# Patient Record
Sex: FEMALE | Race: WHITE
[De-identification: names, ages, dates, MRNs, and addresses within clinical notes are randomized per-mention and may not be internally consistent; named-entity substitution may affect disease eponyms.]

---

## 2017-03-16 ENCOUNTER — HOSPITAL ENCOUNTER (EMERGENCY)
Dept: HOSPITAL 45 - C.EDB | Age: 37
Discharge: HOME | End: 2017-03-16
Payer: COMMERCIAL

## 2017-03-16 VITALS
HEIGHT: 62.01 IN | HEIGHT: 62.01 IN | WEIGHT: 123.24 LBS | BODY MASS INDEX: 22.39 KG/M2 | WEIGHT: 123.24 LBS | BODY MASS INDEX: 22.39 KG/M2

## 2017-03-16 VITALS — TEMPERATURE: 97.88 F

## 2017-03-16 VITALS — HEART RATE: 85 BPM | OXYGEN SATURATION: 97 %

## 2017-03-16 VITALS — SYSTOLIC BLOOD PRESSURE: 107 MMHG | DIASTOLIC BLOOD PRESSURE: 68 MMHG

## 2017-03-16 DIAGNOSIS — T78.40XA: Primary | ICD-10-CM

## 2017-03-16 DIAGNOSIS — J45.909: ICD-10-CM

## 2017-03-16 DIAGNOSIS — F17.200: ICD-10-CM
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
## 2017-03-17 NOTE — EMERGENCY ROOM VISIT NOTE
History


Report prepared by Rudy:  Amy Martinez


Under the Supervision of:  Dr. Esteban Abdi M.D.


First contact with patient:  20:59


Chief Complaint:  ALLERGIC REACTION


Stated Complaint:  ALLERGIC REACTION TO MUSHROOMS





History of Present Illness


The patient is a 37 year old female who presents to the Emergency Room with 

complaints of an episode of an allergic reaction beginning 1 hour PTA. The 

patient has a known allergy to mushrooms. She was eating a salad tonight and 

did not know that there were mushrooms in it. She ingested some of the 

mushrooms around 8pm. Since then she has developed tightness in throat and 

states that her face is flushed. She has had a similar reaction to mushrooms in 

the past. The patient denies any trouble breathing or shortness of breath. She 

has a history of asthma and has been using her inhaler, but states that it is 

not alleviating her sensations of tightness in her throat. She was feeling fine 

before eating the salad this evening. The patient denies any chance of 

pregnancy. She denies hives.





   Source of History:  patient


   Onset:  1 hour PTA


   Position:  other (global)


   Quality:  other (allergic reaction)


   Timing:  other (episode)


   Modifying Factors (Worsening):  eating (mushrooms)


   Associated Symptoms:  No SOB, No rash


Note:


Pt reports tightness in her throat.





Review of Systems


See HPI for pertinent positives & negatives. A total of 10 systems reviewed and 

were otherwise negative.





Past Medical & Surgical


Medical Problems:


(1) Asthma








Family History


No pertinent history stated.





Social History


Smoking Status:  Current Every Day Smoker





Current/Historical Medications


Scheduled


Divalproex Sodium (Depakote Er), 500 MG PO QAM


Divalproex Sodium (Depakote Etended-Release), 1,000 MG PO HS


Magnesium Oxide (Mag-Ox), 400 MG PO DAILY


Prednisone (Prednisone), 3 TAB PO DAILY


[vitamin b], 1 DROP PO DAILY





Allergies


Coded Allergies:  


     Penicillins (Unverified  Allergy, Unknown, anaphylaxis, 3/16/17)





Physical Exam


Vital Signs











  Date Time  Temp Pulse Resp B/P Pulse Ox O2 Delivery O2 Flow Rate FiO2


 


3/16/17 23:14  85 16  97 Room Air  


 


3/16/17 22:56  90 22 107/68 99 Room Air  


 


3/16/17 21:42  105 18  100 Room Air  


 


3/16/17 21:42     100 Room Air  


 


3/16/17 21:41  93      


 


3/16/17 20:55 36.6 107 18 123/90 95 Room Air  











Physical Exam





Constitutional: Vital signs reviewed.


Eyes: Pupils are equal round reactive to light.  Conjunctiva are noninjected.  


ENT: Pharynx is clear without erythema or exudate.  Mucous membranes are moist.

  No swelling to the tongue or uvula.  Neck supple without meningeal signs.


Respiratory: Clear to auscultation bilaterally.  Breath sounds are equal 

bilaterally. No wheezing or stridor. 


Cardiovascular: Regular rate and rhythm.  No rubs or gallops.


GI: Soft, nondistended and nontender.  Bowel sounds are present.


Musculoskeletal: No peripheral edema.  


Integumentary: No cyanosis. No hives, just mild facial flushing. 


Neurological: The patient is awake and alert.  No focal deficits.


Psychiatric: Normal affect.





Medical Decision & Procedures


Medications Administered











 Medications


  (Trade)  Dose


 Ordered  Sig/Anmol


 Route  Start Time


 Stop Time Status Last Admin


Dose Admin


 


 Albuterol/


 Ipratropium


  (Duoneb)  3 ml  NOW  STAT


 INH  3/16/17 21:04


 3/16/17 21:06 DC 3/16/17 21:37


3 ML


 


 Methylprednisolone


 Sodium Succinate


  (Solu-Medrol IV)  125 mg  NOW  STAT


 IV  3/16/17 21:04


 3/16/17 21:06 DC 3/16/17 21:38


125 MG


 


 Diphenhydramine


 HCl


  (Benadryl Inj)  50 mg  NOW  STAT


 IV  3/16/17 21:04


 3/16/17 21:06 DC 3/16/17 21:39


50 MG


 


 Famotidine


  (Pepcid 20mg/100


 ml)  20 mg  ONE  STAT


 IV  3/16/17 21:04


 3/16/17 21:06 DC 3/16/17 21:38


20 MG











ED Course


2059: The patient was evaluated in room B4B. A complete history and physical 

exam was performed.





2104: Famotidine 20 mg IV, Benadryl 50 mg IV, Solu-Medrol 125 mg IV, Duoneb 3 

ml INH





2215: I reassessed the patient. Her breathing is better and she no longer has a 

tight feeling in her throat. She is starting to develop hives on her arms. 





2303: I reassessed the patient at this time. She is feeling better and resting 

comfortably. She no longer has any shortness of breath or tightness in her 

throat. She does have some itching and hives on her hands. I discussed the 

results and treatment plan with the patient. I answered all pertaining 

questions that she had. She expressed understanding and verbalized agreement. 

The patient will be discharged home.





Medical Decision


This is a 37-year-old female presents with allergic reaction.  I did perform a 

limited focused review of portions of the patient's old chart on the electronic 

medical record. The patient has had no recent pertinent visits to this hospital.





I did evaluate the patient as noted above.  IV access was established.  The 

patient was placed on a continuous cardiac monitor.  I did treat patient with 

IV Solu-Medrol, Pepcid and Benadryl.  She was also given a DuoNeb.  She was 

observed in the emergency department.  She did have improvement of her symptoms 

although develop some hives on her hands.  She does have a history of eczema.  

She did feel better and felt ready for discharge.  Her lungs were clear to 

auscultation.  She had no swelling to her tongue or uvula.  She was advised to 

continue using antihistamines.  She was discharged with a prescription for 

prednisone.





Impression





 Primary Impression:  


 Allergic reaction





Scribe Attestation


The scribe's documentation has been prepared under my direct and personally 

reviewed by me in its entirety. I confirm that the note above accurately 

reflects all work, treatment, procedures, and medical decision making performed 

by me.





Departure Information


Dispostion


Home / Self-Care





Prescriptions





Prednisone (Prednisone) 20 Mg Tab


3 TAB PO DAILY, #12 TAB


   FOR 4 DAYS


   Prov: Esteban Abdi M.D.         3/16/17





Referrals


Juliette Membreno D.O. (PCP)





Forms


HOME CARE DOCUMENTATION FORM,                                                 

               IMPORTANT VISIT INFORMATION, Work Instructions





Patient Instructions


ED Allergic React Food, My [facility]





Additional Instructions





You have been examined and treated today on an emergency basis only. This is 

not a substitute for, or an effort to provide, complete comprehensive medical 

care. It is impossible to recognize and treat all injuries or illnesses in a 

single emergency department visit. It is therefore important that you follow up 

closely with your physician.  Call as soon as possible for an appointment.  

Return for worsening symptoms or if you develop difficulty breathing, swelling 

to your tongue or any other concerning symptoms.





Problem Qualifiers








 Primary Impression:  


 Allergic reaction


 Encounter type:  initial encounter  Qualified Codes:  T78.40XA - Allergy, 

unspecified, initial encounter

## 2017-05-06 ENCOUNTER — HOSPITAL ENCOUNTER (EMERGENCY)
Dept: HOSPITAL 45 - C.EDB | Age: 37
Discharge: HOME | End: 2017-05-06
Payer: COMMERCIAL

## 2017-05-06 VITALS
BODY MASS INDEX: 21.31 KG/M2 | HEIGHT: 62.01 IN | WEIGHT: 117.29 LBS | HEIGHT: 62.01 IN | WEIGHT: 117.29 LBS | BODY MASS INDEX: 21.31 KG/M2

## 2017-05-06 VITALS — OXYGEN SATURATION: 99 % | SYSTOLIC BLOOD PRESSURE: 114 MMHG | DIASTOLIC BLOOD PRESSURE: 78 MMHG | HEART RATE: 99 BPM

## 2017-05-06 VITALS — TEMPERATURE: 98.06 F

## 2017-05-06 DIAGNOSIS — L25.9: Primary | ICD-10-CM

## 2017-05-06 DIAGNOSIS — J45.909: ICD-10-CM

## 2017-05-06 DIAGNOSIS — Z79.899: ICD-10-CM

## 2017-05-06 NOTE — EMERGENCY ROOM VISIT NOTE
ED Visit Note


First contact with patient:  18:13


CHIEF COMPLAINT: Burning left arm skin rash since last evening





HISTORY OF PRESENT ILLNESS:  Patient is a right-hand-dominant 37-year-old white 

female who presents to the emergency department for evaluation of a rash on her 

left hand.  She works as a  at a local restaurant.  She does not know 

if the restaurant changed sanitizers or increased to be concentration last 

evening.  She does not wear gloves.  She states that she noticed some swelling 

and soreness in the back of her left hand in her fingers last night as she left 

for work.  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 

becomes swollen, so she did not think much of it, but throughout the night and 

when she woke up today, she noted redness, warmth and swelling spreading up the 

dorsum of the left hand to the mid forearm, and on the ulnar aspect of the 

forearm to the elbow.  She describes it as a tight, burning sensation.  She 

took Aleve and applied a hydrocortisone cream to the area, but it was 

ineffective.  She did work almost her entire shift as a  today.  She 

rates her discomfort an 8/10.  She does not have any symptoms on the right 

hand.  She denies any fever, chills, sweats, drainage or discharge from the 

area.  She is never had symptoms similar to this previously.  





REVIEW OF SYSTEMS:   Review of systems as per HPI.  All other systems reviewed 

were negative.  At least 6 systems reviewed.





PMH:  Electronic medical records are reviewed and summarized as above/below.  

See Problem List.





SOCIAL HISTORY:  Patient lives at home by herself.  She does smoke, denies 

alcohol use..    





PHYSICAL EXAM: Vital Signs: See nurses' notes.  CONSTITUTIONAL: Patient is a 

well-appearing 37-year-old white female who is awake and alert and in no acute 

distress.  INTEGUMENTARY: Examination of the left arm show a slightly raised, 

warm erythematous rash, involving the dorsum of the hand extending to the mid 

forearm, and wrapping around to the volar aspect of the forearm, sparing the 

palm and the fingers.  No blistering, skin sloughing, vesicles or petechiae are 

noted.  There is no lymphangitic streaking.  Capillary refills less than 2 

seconds.  Sensation light touch is intact.


   


ED course: The patient was seen and assessed as above.  She does appear to have 

a rash consistent with a contact dermatitis on her left hand.  It spares her 

fingers and the palm.  Skin is otherwise intact.  There is no blistering to 

indicate a burn.  I do not suspect cellulitis.  The patient was given 

prednisone 60 mg orally in the emergency department.  She will be placed on a 

short course of oral prednisone.  She is encouraged to protect the area when 

she returns to work and to wear gloves.  I cannot say whether this is related 

to her exposure at work or not.  It seems unusual that it would only involve 

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 

exposed to the same cleaning agents.


Problem List


Medical Problems:


(1) Allergic reaction


Status: Resolved  





(2) Asthma


Status: Chronic  





Surgical Problems:


(1) H/O  section


Status: Resolved  











Current/Historical Medications


Scheduled


Cyanocobalamin (B-12 Dots), 1,000 MCG PO DAILY


Divalproex Sodium (Depakote Er), 500 MG PO QAM


Divalproex Sodium (Depakote Etended-Release), 1,000 MG PO HS


Hydrocortisone (Topical) (Cortizone-10), 1 APPLN TOP TID


Magnesium Oxide (Mag-Ox), 400 MG PO DAILY


Naproxen (Aleve), 220 MG PO DAILY


Prednisone (Prednisone), 50 MG PO DAILY





Scheduled PRN


Sumatriptan Succinate (Imitrex), 500 MG PO PRN PRN for Migraine





Allergies


Coded Allergies:  


     Mushroom (Unverified  Allergy, Severe, ANAPHYLAXIS, 17)


     Penicillins (Unverified  Allergy, Unknown, anaphylaxis, 17)


Uncoded Allergies:  


     VENISON (Allergy, Severe, ANAPHYLAXIS, 17)





Vital Signs











  Date Time  Temp Pulse Resp B/P Pulse Ox O2 Delivery O2 Flow Rate FiO2


 


17 19:02  99 18 114/78 99   


 


17 17:53 36.7 82 16 125/76 97 Room Air  











Medications Administered











 Medications


  (Trade)  Dose


 Ordered  Sig/Anmol


 Route  Start Time


 Stop Time Status Last Admin


Dose Admin


 


 Prednisone


  (PredniSONE TAB)  60 mg  NOW  STAT


 PO  17 18:37


 17 18:38 DC 17 18:50


60 MG











Departure Information


Impression





 Primary Impression:  


 Contact dermatitis





Prescriptions





Prednisone (Prednisone) 50 Mg Tab


50 MG PO DAILY for 4 Days, #4 TAB


   Prov: Sarahi Weaver PA         17





Referrals


No Doctor, Assigned (PCP)





Patient Instructions


My [facility]





Additional Instructions





Prednisone 50mg:  Once daily until the prescription is finished.  It is best to 

take this earlier in the day as some patients note occasional difficulty 

falling asleep when taken in the late evening.





Diphenhydramine(Benadryl) 25mg:  use 25 to 50 mg as needed every six hours for 

swelling, itching, or hives.  This medication is sedating and will cause 

drowsiness. Avoid alcohol, operating machinery or dangerous equipment, working 

on ladders or roofs, DRIVING, or situations where being under the influence may 

be dangerous.





Zantac 75: Take two pills twice a day along with Benadryl as needed for swelling

, itching, or hives. Most people know this for its affect on the stomach, but 

it also acts similar to, but less potent than Benadryl for allergic reactions.  





Both the Benadryl and the Zantac are available over-the-counter.





Elevate the arm and apply cool compresses as needed for pain and swelling.





Continue current medications.





Return to the emergency department for worsening of your rash, fevers, vomiting

, worsening symptoms or as needed.


 


Follow-up with your primary care physician in 2-3 days for a recheck of your 

current condition.

## 2017-06-21 ENCOUNTER — HOSPITAL ENCOUNTER (EMERGENCY)
Dept: HOSPITAL 45 - C.EDB | Age: 37
Discharge: HOME | End: 2017-06-21
Payer: COMMERCIAL

## 2017-06-21 VITALS — SYSTOLIC BLOOD PRESSURE: 104 MMHG | HEART RATE: 65 BPM | OXYGEN SATURATION: 99 % | DIASTOLIC BLOOD PRESSURE: 69 MMHG

## 2017-06-21 VITALS
WEIGHT: 114.2 LBS | HEIGHT: 62.01 IN | BODY MASS INDEX: 20.75 KG/M2 | WEIGHT: 114.2 LBS | HEIGHT: 62.01 IN | BODY MASS INDEX: 20.75 KG/M2

## 2017-06-21 VITALS — TEMPERATURE: 97.88 F

## 2017-06-21 DIAGNOSIS — J45.909: ICD-10-CM

## 2017-06-21 DIAGNOSIS — Z79.899: ICD-10-CM

## 2017-06-21 DIAGNOSIS — J32.9: Primary | ICD-10-CM

## 2017-06-21 DIAGNOSIS — F17.210: ICD-10-CM

## 2017-06-21 NOTE — DIAGNOSTIC IMAGING REPORT
CHEST 2 VIEWS ROUTINE



CLINICAL HISTORY: Productive cough    



COMPARISON STUDY:  No previous studies for comparison.



FINDINGS: The cardiac and mediastinal contours are normal. There is no evidence

of focal pulmonary consolidation. There is no evidence of failure. No pleural

effusions are visualized.[ 



IMPRESSION: No active disease in the chest.







Electronically signed by:  Sal Elizabeth M.D.

6/21/2017 10:38 PM



Dictated Date/Time:  6/21/2017 10:38 PM

## 2017-06-21 NOTE — EMERGENCY ROOM VISIT NOTE
History


First contact with patient:  21:45


Chief Complaint:  SINUS CONGESTION/PRESSURE


Stated Complaint:  SINUS INFECTION FOR 2 WEEKS


Nursing Triage Summary:  


Sinus infection for several weeks. Denies being seen by PCP. Pt taking Benadryl 


and Ibuprofen with no relief. Congestion, coughing up "green shit and blowing 


gree stuff out of nose"





History of Present Illness


The patient is a 37 year old female who presents to the Emergency Room via 

private vehicle accompanied by female with complaints of "sinus infection for 2 

weeks".  The patient states that she has had sinus pressure around her nose for 

the past 3 weeks, and also has been passing green drainage from her nose as 

well as coughing up green phlegm.  She has had chest congestion with cough for 

about 3 weeks as well.  She notes that she had sinus surgery age 21, and her 

father  from a sinus infection.  She's been working a lot and has not been 

able to tend to her sinus congestion.  She denies any sore throat, or chills.  

She believes that she may have an associated fever.





Review of Systems


A complete 6-point Review of Systems was discussed with the patient, with 

pertinent positives and negatives listed in the History of Present Illness. All 

remaining Review of Systems questions can be considered negative unless 

otherwise specified.





Past Medical/Surgical History


Medical Problems:


(1) Allergic reaction


(2) Asthma


Surgical Problems:


(1) H/O  section








Family History


Previous sinus infection





Social History


Smoking Status:  Current Every Day Smoker





Current/Historical Medications


Scheduled


Cyanocobalamin (B-12 Dots), 1,000 MCG PO DAILY


Divalproex Sodium (Depakote Er), 500 MG PO QAM


Divalproex Sodium (Depakote Etended-Release), 1,000 MG PO HS


Doxycycline (Monohydrate) (Doxycycline), 100 MG PO BID


Magnesium Oxide (Mag-Ox), 400 MG PO DAILY


Naproxen (Aleve), 220 MG PO DAILY





Scheduled PRN


Hydrocortisone (Topical) (Cortizone-10), 1 APPLN TOP TID PRN for Itching


Sumatriptan Succinate (Imitrex), 500 MG PO PRN PRN for Migraine





Allergies


Coded Allergies:  


     Mushroom (Unverified  Allergy, Severe, ANAPHYLAXIS, 17)


     Penicillins (Unverified  Allergy, Unknown, anaphylaxis, 17)


Uncoded Allergies:  


     VENISON (Allergy, Severe, ANAPHYLAXIS, 17)





Physical Exam


Vital Signs











  Date Time  Temp Pulse Resp B/P (MAP) Pulse Ox O2 Delivery O2 Flow Rate FiO2


 


17 23:20  65 18 104/69 99   


 


17 21:35     97 Room Air  


 


17 21:32 36.6 88 18 122/76 97 Room Air  











Physical Exam


VITAL SIGNS - Vital signs and nursing notes were reviewed.  Patient is afebrile

, normotensive at 122/76, non-tachycardic and saturating well on room air at 97%

.


GENERAL -37-year-old female appearing her stated age who is in no acute 

distress. Communicates well with provider and answers questions appropriately.


SKIN - Without rashes.  No petechial rashes.


HEAD - NC/AT.


EYES - PERRL with EOMI bilaterally. Sclera anicteric. Palpebral conjunctiva 

pink and moist with no injection noted.


EARS - No deformities of external structures noted on gross examination 

bilaterally. No pain elicited with palpation of the tragus bilaterally. 

External auditory canals without discharge or otorrhea. Tympanic membranes 

pearly gray without retraction or bulging. No fluid or purulent material 

visualized behind the TM. Handle of malleus, umbo, cone of light, pars tensa/

flaccid all easily visualized.


NOSE - Midline and without cyanosis. No epistaxis or purulent drainage noted. 

Septum midline without deviation or septal hematoma noted.  There is facial 

tenderness to palpation overlying the maxillary sinuses.


MOUTH/OROPHARYNX - Without perioral cyanosis. Buccal mucosa pink and moist and 

without leukoplakia. Tongue midline with equal elevation of palate bilaterally. 

No tonsillar hypertrophy, erythema, or exudates noted.  Fair dentition.


NECK - Neck with FROM. Supple to palpation.  No lymphadenopathy noted. No 

nuchal rigidity.  No meningismus.


LUNGS - Chest wall symmetric without accessory muscle use, intercostals 

retractions, or central cyanosis. Normal vesicular breath sounds CTA B/L. No 

wheezes, rales, or rhonchi appreciated.


CARDIAC - RRR with S1/S2. No murmur, rubs, or gallops appreciated.





Medical Decision & Procedures


ER Provider


Diagnostic Interpretation:


CHEST 2 VIEWS ROUTINE





CLINICAL HISTORY: Productive cough    





COMPARISON STUDY:  No previous studies for comparison.





FINDINGS: The cardiac and mediastinal contours are normal. There is no evidence


of focal pulmonary consolidation. There is no evidence of failure. No pleural


effusions are visualized.[ 





IMPRESSION: No active disease in the chest.











Electronically signed by:  Sal Elizabeth M.D.


2017 10:38 PM





Dictated Date/Time:  2017 10:38 PM





Medications Administered











 Medications


  (Trade)  Dose


 Ordered  Sig/Anmol


 Route  Start Time


 Stop Time Status Last Admin


Dose Admin


 


 Doxycycline


 Hyclate


  (Vibramycin Cap)  100 mg  ONE  STAT


 PO  17 23:03


 17 23:04 DC 17 23:19


100 MG











Medical Decision


Patient was seen and evaluated as above.  After obtaining a thorough history 

and physical examination benefit versus risk of obtaining a sinus CT scan as 

well as a chest x-ray were discussed.  Decision was made to hold off from the 

CT scan but obtaining the chest x-ray.  Results as above.  At this time I 

suspect the patient is likely experiencing acute sinusitis, due to's duration 

will treat with an antibiotic.  She is penicillin allergic, therefore a quick 

query of the up-to-date medicine recommendation states that doxycycline may be 

used first line in penicillin allergic individuals.  This will be provided for 

7 days.  She was given the first dose here with remainder sent to the pharmacy.

  She was educated upon the importance of follow-up with her family doctor and 

to return here if worsening.  She was educated upon worrisome symptoms which to 

return, had questions as per discharge, and were discharged home in good 

condition.  X-ray negative for acute process.





In evaluation treatment this patient following differential diagnoses were 

entertained: Acute bacterial rhinosinusitis, bronchitis, pneumonia, among 

others.





Impression





 Primary Impression:  


 Sinusitis





Departure Information


Dispostion


Home / Self-Care





Condition


GOOD





Prescriptions





Doxycycline (Monohydrate) (Doxycycline) 100 Mg Cap


100 MG PO BID, #13 TABS


   Prov: Miguel Baron PA-C         17





Referrals


No Doctor, Assigned (PCP)





Patient Instructions


My [facility]





Additional Instructions








You were seen in the emergency department for your mucus drainage and sinus 

infection.





You have been prescribed Doxycycline to be taken as prescribed. This is an 

antibiotic. All antibiotics have the potential to cause diarrhea. Stop this 

medication and contact a medical provider if you were to develop any 

significant adverse side effects including: wheezing, shortness of breath, 

passing out, vomiting, or a diffuse rash. Always take antibiotics as directed 

and COMPLETE the ENTIRE course regardless of the improvement of your symptoms. 

Protect yourself with sunscreen while on this antibiotic as it increases your 

skin's sensitivity to the light and cause bad sunburns. In addition, you should 

be sure to take this pill after eating. Make sure the pill is completely 

swallowed as this medication can cause irritation to the lining of the 

esophagus. Do NOT drink milk or eat anything with large amounts of Calcium in 

them 1 hour prior to taking this medication as this will decrease the 

effectiveness of the medication.





For pain and fever control, you can use the following over-the-counter 

medicines (if >13 yo):





- Regular strength (325mg/tab) Tylenol (acetaminophen) 2 tabs every 4-6 hours 

as needed. Do not exceed 12 tablets in a 24 hour period. Avoid taking more than 

3 grams (3000 mg) of Tylenol per day. This includes any other sources of 

acetaminophen you may take on a regular basis.





- Regular strength (200 mg/tab) Advil (ibuprofen) 1-2 tabs every 4-6 hours as 

needed. Do not exceed a dose of 3200 mg per day.


 


- For best results, alternate dosing of Tylenol and Advil.





you may use over-the-counter decongestants such as guaifenesin.





Follow up with your primary care provider in 2-3 days from today's emergency 

department visit.








Please return to the emergency department with any new/concerning symptoms.

## 2017-09-23 ENCOUNTER — HOSPITAL ENCOUNTER (EMERGENCY)
Dept: HOSPITAL 45 - C.EDB | Age: 37
Discharge: HOME | End: 2017-09-23
Payer: COMMERCIAL

## 2017-09-23 VITALS — HEART RATE: 73 BPM | DIASTOLIC BLOOD PRESSURE: 74 MMHG | SYSTOLIC BLOOD PRESSURE: 119 MMHG | OXYGEN SATURATION: 100 %

## 2017-09-23 VITALS — TEMPERATURE: 98.24 F

## 2017-09-23 VITALS
WEIGHT: 118.61 LBS | BODY MASS INDEX: 21.55 KG/M2 | HEIGHT: 62.01 IN | WEIGHT: 118.61 LBS | HEIGHT: 62.01 IN | BODY MASS INDEX: 21.55 KG/M2
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
## 2017-09-23 DIAGNOSIS — Z79.899: ICD-10-CM
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
## 2017-09-23 DIAGNOSIS — J45.909: ICD-10-CM

## 2017-09-23 DIAGNOSIS — F17.200: ICD-10-CM
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
## 2017-09-23 DIAGNOSIS — S61.215A: Primary | ICD-10-CM

## 2017-09-23 NOTE — EMERGENCY ROOM VISIT NOTE
History


First contact with patient:  15:28


Chief Complaint:  LACERATION/CUT (NON-SUTURE)


Stated Complaint:  SLICED LF RING FINGER,WC


Nursing Triage Summary:  


Laceration to left ring finger today while at work.  Glass bowl shattered in 


hand.





History of Present Illness


The patient is a 37 year old female who presents to the Emergency Room with 

complaints of a laceration to her left ring finger.  The patient reports that 

the injury happened at work while attempting to move a stack of glass bowls and 

one of them broke and cut her finger.  The patient reports significant bleeding 

from the wound.  She rates her discomfort a 6 out of 10.  Tetanus immunization 

is up-to-date, and the patient is right-hand-dominant.





Review of Systems


6 system review was performed and was negative except for pertinent positives 

and negatives as indicated in history of present illness





Past Medical/Surgical History


Medical Problems:


(1) Allergic reaction


(2) Asthma


Surgical Problems:


(1) H/O  section








Family History


Unremarkable





Social History


Smoking Status:  Current Every Day Smoker


Alcohol Use:  occasionally


Marital Status:  


Housing Status:  lives with family


Occupation Status:  employed





Current/Historical Medications


Scheduled


Cyanocobalamin (B-12 Dots), 1,000 MCG PO DAILY


Divalproex Sodium (Depakote Er), 500 MG PO QAM


Divalproex Sodium (Depakote Etended-Release), 1,000 MG PO HS


Doxycycline (Monohydrate) (Doxycycline), 100 MG PO BID


Magnesium Oxide (Mag-Ox), 400 MG PO DAILY


Naproxen (Aleve), 220 MG PO DAILY





Scheduled PRN


Hydrocortisone (Topical) (Cortizone-10), 1 APPLN TOP TID PRN for Itching


Sumatriptan Succinate (Imitrex), 500 MG PO PRN PRN for Migraine





Physical Exam


Vital Signs











  Date Time  Temp Pulse Resp B/P (MAP) Pulse Ox O2 Delivery O2 Flow Rate FiO2


 


17 15:24 36.8 73 17 119/74 100 Room Air  











Physical Exam


CONSTITUTIONAL:  Healthy and well nourished.  Alert and oriented X 3 with 

positive affect.


HEENT:  Normocephalic, atraumatic.  Pupils equal, round and reactive.  


MUSCULOSKELETAL: Examination of the left ring finger shows a small 0.75 cm flap 

laceration without active bleeding on exam.  There is no involvement of the 

nail plate.  The refill of the fingertip is less than 2 seconds.


INTEGUMENTARY:  No rash or other significant dermatologic conditions noted.


NEUROLOGIC:  No focal neurologic deficits noted.





Medical Decision & Procedures


Procedure


Left fourth fingertip laceration repair was performed under digital block 

anesthesia.  Using buffered 1% lidocaine without epinephrine, good digital 

block anesthesia was administered.  The peripheral tissue was enclosed with 

iodine, then the wound was irrigated with normal saline prior to closure using 5

-0 nylon simple interrupted sutures 3.  Bacitracin Band-Aid was applied.  The 

patient tolerated the procedure well.





ED Course


Patient history and physical exam were performed.  Nurse's notes were reviewed.

  Vital signs were reviewed and were normal.  Laceration repair was performed 

under digital block anesthesia.  The patient was provided additional verbal and 

written wound care instructions.  Ice and elevation for swelling.  Ibuprofen or 

Tylenol as needed for additional pain relief.  Suture removal in 12-14 days, or 

seek reevaluation sooner for any signs of wound infection.  The patient was 

happy with plan of care, voiced understanding of all discharge instructions, 

and denied any pain at the time of discharge.





Medical Decision








Medication Reconcilliation


Current Medication List:  was personally reviewed by me





Blood Pressure Screening


Patient's blood pressure:  Normal blood pressure





Impression





 Primary Impression:  


 Laceration of left ring finger


 Additional Impression:  


 Work related injury





Departure Information


Dispostion


Home / Self-Care





Forms


HOME CARE DOCUMENTATION FORM,                                                 

               IMPORTANT VISIT INFORMATION





Patient Instructions


[facility]





Additional Instructions





Keep wound clean and dry.  Do not allow any crusting or dried blood to 

accumulate on sutures.  If this occurs, use a 1:1 solution of hydrogen peroxide/

water on a Q-tip to clean the wound.  Use an antibiotic ointment for 3-4 days, 

then let wound dry.  Suture removal in 12-14 days.  Return sooner for any signs 

of infection (increasing redness, swelling, drainage).





Ice and elevate for swelling and pain.  Ibuprofen 600 mg and/or Tylenol 1000 mg 

every 6 hrs as needed for pain.





Problem Qualifiers








 Primary Impression:  


 Laceration of left ring finger


 Encounter type:  initial encounter  Damage to nail status:  without damage  

Foreign body presence:  without foreign body  Qualified Codes:  S61.215A - 

Laceration without foreign body of left ring finger without damage to nail, 

initial encounter

## 2017-10-02 ENCOUNTER — HOSPITAL ENCOUNTER (EMERGENCY)
Dept: HOSPITAL 45 - C.EDB | Age: 37
Discharge: HOME | End: 2017-10-02
Payer: COMMERCIAL

## 2017-10-02 VITALS
TEMPERATURE: 97.52 F | HEART RATE: 104 BPM | DIASTOLIC BLOOD PRESSURE: 76 MMHG | SYSTOLIC BLOOD PRESSURE: 117 MMHG | OXYGEN SATURATION: 98 %

## 2017-10-02 VITALS
HEIGHT: 64.02 IN | BODY MASS INDEX: 19.46 KG/M2 | WEIGHT: 113.98 LBS | BODY MASS INDEX: 19.46 KG/M2 | HEIGHT: 64.02 IN | WEIGHT: 113.98 LBS
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
## 2017-10-02 DIAGNOSIS — S61.215D: Primary | ICD-10-CM

## 2017-10-02 DIAGNOSIS — Z79.899: ICD-10-CM

## 2017-10-02 DIAGNOSIS — J45.909: ICD-10-CM

## 2017-10-02 DIAGNOSIS — F17.210: ICD-10-CM

## 2017-10-02 NOTE — EMERGENCY ROOM VISIT NOTE
History


First contact with patient:  14:17


Chief Complaint:  SUTURE/STAPLE REMOVAL


Stated Complaint:  STITCHES REMOVAL


Nursing Triage Summary:  


here for suture removal of left ring finger





History of Present Illness


The patient is a 37 year old female who presents to the Emergency Room for 

suture removal from a left ring finger laceration that was repaired by me 9 

days ago.  The patient denies any wound complications.





Review of Systems


Noncontributory





Past Medical/Surgical History


Medical Problems:


(1) Allergic reaction


(2) Asthma


Surgical Problems:


(1) H/O  section








Social History


Smoking Status:  Current Every Day Smoker


Alcohol Use:  occasionally


Marital Status:  


Housing Status:  lives with family


Occupation Status:  employed





Current/Historical Medications


Scheduled


Cyanocobalamin (B-12 Dots), 1,000 MCG PO DAILY


Divalproex Sodium (Depakote Er), 500 MG PO QAM


Divalproex Sodium (Depakote Etended-Release), 1,000 MG PO HS


Doxycycline Monohydrate (Monodox), 100 MG PO BID


Magnesium Oxide (Mag-Ox), 400 MG PO DAILY


Naproxen (Aleve), 220 MG PO DAILY





Scheduled PRN


Hydrocortisone (Topical) (Cortizone-10), 1 APPLN TOP TID PRN for Itching


Sumatriptan Succinate (Imitrex), 500 MG PO PRN PRN for Migraine





Physical Exam


Vital Signs











  Date Time  Temp Pulse Resp B/P (MAP) Pulse Ox O2 Delivery O2 Flow Rate FiO2


 


10/2/17 14:10 36.4 104 16 117/76 98 Room Air  











Physical Exam


MUSCULOSKELETAL: Examination of the left ring finger digital pad shows a well-

healed laceration without any erythema, drainage or induration.  All sutures 

were removed without complication.





Medical Decision & Procedures


ED Course


The patient was provided additional verbal wound care instructions.  Watch for 

any signs of developing infection, and avoid any undue stress on the wound 

until it further heals.  The patient was happy with plan of care, and denied 

any pain at the time of discharge.





Medical Decision








Impression





 Primary Impression:  


 Encounter for removal of sutures


 Additional Impression:  


 Laceration of left ring finger





Departure Information


Dispostion


Home / Self-Care





Forms


HOME CARE DOCUMENTATION FORM,                                                 

               IMPORTANT VISIT INFORMATION





Patient Instructions


 [place]





Additional Instructions





May return to work without restrictions





Problem Qualifiers








 Additional Impression:  


 Laceration of left ring finger


 Encounter type:  subsequent encounter  Damage to nail status:  without damage  

Foreign body presence:  without foreign body  Qualified Codes:  S61.215D - 

Laceration without foreign body of left ring finger without damage to nail, 

subsequent encounter

## 2018-01-15 ENCOUNTER — HOSPITAL ENCOUNTER (EMERGENCY)
Dept: HOSPITAL 45 - C.EDB | Age: 38
Discharge: HOME | End: 2018-01-15
Payer: COMMERCIAL

## 2018-01-15 VITALS — OXYGEN SATURATION: 100 % | HEART RATE: 69 BPM | SYSTOLIC BLOOD PRESSURE: 108 MMHG | DIASTOLIC BLOOD PRESSURE: 69 MMHG

## 2018-01-15 VITALS
HEIGHT: 62.01 IN | WEIGHT: 113.54 LBS | HEIGHT: 62.01 IN | WEIGHT: 113.54 LBS | BODY MASS INDEX: 20.63 KG/M2 | BODY MASS INDEX: 20.63 KG/M2

## 2018-01-15 VITALS — TEMPERATURE: 97.88 F

## 2018-01-15 DIAGNOSIS — Z79.899: ICD-10-CM

## 2018-01-15 DIAGNOSIS — R19.7: ICD-10-CM

## 2018-01-15 DIAGNOSIS — F17.200: ICD-10-CM

## 2018-01-15 DIAGNOSIS — J45.909: ICD-10-CM

## 2018-01-15 DIAGNOSIS — Z88.0: ICD-10-CM

## 2018-01-15 DIAGNOSIS — R11.2: Primary | ICD-10-CM

## 2018-01-15 DIAGNOSIS — R10.9: ICD-10-CM

## 2018-01-15 DIAGNOSIS — Z91.018: ICD-10-CM

## 2018-01-15 DIAGNOSIS — Z88.8: ICD-10-CM

## 2018-01-15 LAB
ALBUMIN SERPL-MCNC: 4.6 GM/DL (ref 3.4–5)
ALP SERPL-CCNC: 62 U/L (ref 45–117)
ALT SERPL-CCNC: 19 U/L (ref 12–78)
AST SERPL-CCNC: 12 U/L (ref 15–37)
BASOPHILS # BLD: 0.09 K/UL (ref 0–0.2)
BASOPHILS NFR BLD: 1.1 %
BUN SERPL-MCNC: 17 MG/DL (ref 7–18)
CALCIUM SERPL-MCNC: 8.8 MG/DL (ref 8.5–10.1)
CO2 SERPL-SCNC: 25 MMOL/L (ref 21–32)
CREAT SERPL-MCNC: 0.61 MG/DL (ref 0.6–1.2)
EOS ABS #: 0.09 K/UL (ref 0–0.5)
EOSINOPHIL NFR BLD AUTO: 183 K/UL (ref 130–400)
GLUCOSE SERPL-MCNC: 97 MG/DL (ref 70–99)
HCT VFR BLD CALC: 41.8 % (ref 37–47)
HGB BLD-MCNC: 14.6 G/DL (ref 12–16)
IG#: 0.02 K/UL (ref 0–0.02)
IMM GRANULOCYTES NFR BLD AUTO: 23.3 %
LIPASE: 193 U/L (ref 73–393)
LYMPHOCYTES # BLD: 1.89 K/UL (ref 1.2–3.4)
MCH RBC QN AUTO: 33.6 PG (ref 25–34)
MCHC RBC AUTO-ENTMCNC: 34.9 G/DL (ref 32–36)
MCV RBC AUTO: 96.3 FL (ref 80–100)
MONO ABS #: 0.77 K/UL (ref 0.11–0.59)
MONOCYTES NFR BLD: 9.5 %
NEUT ABS #: 5.25 K/UL (ref 1.4–6.5)
NEUTROPHILS # BLD AUTO: 1.1 %
NEUTROPHILS NFR BLD AUTO: 64.8 %
PMV BLD AUTO: 11.9 FL (ref 7.4–10.4)
POTASSIUM SERPL-SCNC: 3.9 MMOL/L (ref 3.5–5.1)
PROT SERPL-MCNC: 7.2 GM/DL (ref 6.4–8.2)
RED CELL DISTRIBUTION WIDTH CV: 12.9 % (ref 11.5–14.5)
RED CELL DISTRIBUTION WIDTH SD: 44.9 FL (ref 36.4–46.3)
SODIUM SERPL-SCNC: 139 MMOL/L (ref 136–145)
WBC # BLD AUTO: 8.11 K/UL (ref 4.8–10.8)

## 2018-01-15 RX ADMIN — LOPERAMIDE HYDROCHLORIDE STA MG: 2 CAPSULE ORAL at 22:46

## 2018-01-15 RX ADMIN — LOPERAMIDE HYDROCHLORIDE STA MG: 2 CAPSULE ORAL at 21:21

## 2018-01-15 NOTE — EMERGENCY ROOM VISIT NOTE
History


Report prepared by Rudy:  Raven Topete


Under the Supervision of:  Dr. Bernardo Del Rio M.D.


First contact with patient:  21:16


Chief Complaint:  NAUSEA


Stated Complaint:  CAN'T KEEP ANYTHING DOWN





History of Present Illness


The patient is a 37 year old female who presents to the Emergency Room with 

complaints of persistent nausea and diarrhea that started about 7 hours ago. 

The patient rates her pain a 10/10 in severity. The patient states she has been 

having lower abdominal pain. She notes this pain feels like "cramping". She 

notes "it feels like someone keeps punching me in the stomach".  She denies any 

blood in her vomit or stool. She also denies any urine symptoms. She thinks she 

had a fever earlier today but she took Aleve and it seemed to bring her 

temperature down.





   Source of History:  patient


   Onset:  7 hours ago


   Position:  other (global)


   Symptom Intensity:  10/10


   Quality:  cramping


   Timing:  other (persistent)


   Associated Symptoms:  + fevers, + abdominal pain, No urinary symptoms


Note:


Additional symptoms: No blood in vomit or stool.





Review of Systems


See HPI for pertinent positives & negatives. A total of 10 systems reviewed and 

were otherwise negative.





Past Medical & Surgical


Medical Problems:


(1) Allergic reaction


(2) Asthma


Surgical Problems:


(1) H/O  section








Family History





No pertinent family history





Social History


Smoking Status:  Current Some Day Smoker


Alcohol Use:  occasionally


Marital Status:  


Housing Status:  lives with family


Occupation Status:  employed





Current/Historical Medications


Scheduled


Cyanocobalamin (Vitamin B-12), Unknown Dose PO DAILY


Gabapentin (Neurontin), 800 MG PO QAM


Gabapentin (Neurontin), 1,600 MG PO QPM


Magnesium Oxide (Mag-Ox), 400 MG PO QPM


Ondasetron Odt (Zofran Odt), 4 MG SL Q6H





Scheduled PRN


Sumatriptan Succinate (Imitrex), 500 MG PO PRN PRN for Migraine





Allergies


Coded Allergies:  


     Mushroom (Unverified  Allergy, Severe, ANAPHYLAXIS, 10/2/17)


     Penicillins (Unverified  Allergy, Unknown, anaphylaxis, 10/2/17)


     Vitamin B12 (Unverified  Allergy, Unknown, ., 10/2/17)


Uncoded Allergies:  


     VENISON (Allergy, Severe, ANAPHYLAXIS, 17)





Physical Exam


Vital Signs











  Date Time  Temp Pulse Resp B/P (MAP) Pulse Ox O2 Delivery O2 Flow Rate FiO2


 


1/15/18 21:10 36.6 76 18 139/80 96 Room Air  











Physical Exam


GENERAL: Patient is in no acute distress.


HEENT: No acute trauma, normocephalic atraumatic, mucous membranes moist, no 

nasal congestion, no scleral icterus.


NECK: No stridor, no adenopathy, no meningismus, trachea is midline.


LUNGS: Clear to auscultation bilaterally, no wheeze, no rhonchi, breath sounds 

equal.


HEART: Without murmurs gallops or rubs, regular rate and rhythm.


ABDOMEN: Soft, mildly diffusely tender, bowel sounds hyperactive, no hernias, 

no peritonitis.


EXTREMITIES: No cyanosis or edema, full range of motion of all the joints 

without pain or difficulty, no signs for acute trauma.


NEUROLOGIC: Oriented x 3, no acute motor or sensory deficits, no focal weakness.


SKIN: No rash, no jaundice, no diaphoresis.





Medical Decision & Procedures


Laboratory Results


1/15/18 21:35








Red Blood Count 4.34, Mean Corpuscular Volume 96.3, Mean Corpuscular Hemoglobin 

33.6, Mean Corpuscular Hemoglobin Concent 34.9, Mean Platelet Volume 11.9, 

Neutrophils (%) (Auto) 64.8, Lymphocytes (%) (Auto) 23.3, Monocytes (%) (Auto) 

9.5, Eosinophils (%) (Auto) 1.1, Basophils (%) (Auto) 1.1, Neutrophils # (Auto) 

5.25, Lymphocytes # (Auto) 1.89, Monocytes # (Auto) 0.77, Eosinophils # (Auto) 

0.09, Basophils # (Auto) 0.09





1/15/18 21:35

















Test


  1/15/18


21:35


 


White Blood Count


  8.11 K/uL


(4.8-10.8)


 


Red Blood Count


  4.34 M/uL


(4.2-5.4)


 


Hemoglobin


  14.6 g/dL


(12.0-16.0)


 


Hematocrit 41.8 % (37-47) 


 


Mean Corpuscular Volume


  96.3 fL


()


 


Mean Corpuscular Hemoglobin


  33.6 pg


(25-34)


 


Mean Corpuscular Hemoglobin


Concent 34.9 g/dl


(32-36)


 


Platelet Count


  183 K/uL


(130-400)


 


Mean Platelet Volume


  11.9 fL


(7.4-10.4)


 


Neutrophils (%) (Auto) 64.8 % 


 


Lymphocytes (%) (Auto) 23.3 % 


 


Monocytes (%) (Auto) 9.5 % 


 


Eosinophils (%) (Auto) 1.1 % 


 


Basophils (%) (Auto) 1.1 % 


 


Neutrophils # (Auto)


  5.25 K/uL


(1.4-6.5)


 


Lymphocytes # (Auto)


  1.89 K/uL


(1.2-3.4)


 


Monocytes # (Auto)


  0.77 K/uL


(0.11-0.59)


 


Eosinophils # (Auto)


  0.09 K/uL


(0-0.5)


 


Basophils # (Auto)


  0.09 K/uL


(0-0.2)


 


RDW Standard Deviation


  44.9 fL


(36.4-46.3)


 


RDW Coefficient of Variation


  12.9 %


(11.5-14.5)


 


Immature Granulocyte % (Auto) 0.2 % 


 


Immature Granulocyte # (Auto)


  0.02 K/uL


(0.00-0.02)


 


Anion Gap


  8.0 mmol/L


(3-11)


 


Est Creatinine Clear Calc


Drug Dose 98.0 ml/min 


 


 


Estimated GFR (


American) 132.4 


 


 


Estimated GFR (Non-


American 114.2 


 


 


BUN/Creatinine Ratio 28.0 (10-20) 


 


Calcium Level


  8.8 mg/dl


(8.5-10.1)


 


Total Bilirubin


  0.3 mg/dl


(0.2-1)


 


Aspartate Amino Transf


(AST/SGOT) 12 U/L (15-37) 


 


 


Alanine Aminotransferase


(ALT/SGPT) 19 U/L (12-78) 


 


 


Alkaline Phosphatase


  62 U/L


()


 


Total Protein


  7.2 gm/dl


(6.4-8.2)


 


Albumin


  4.6 gm/dl


(3.4-5.0)


 


Globulin


  2.6 gm/dl


(2.5-4.0)


 


Albumin/Globulin Ratio 1.7 (0.9-2) 


 


Lipase


  193 U/L


()


 


Human Chorionic Gonadotropin,


Qual NEG (NEG) 


 





Laboratory results reviewed by me.





Medications Administered











 Medications


  (Trade)  Dose


 Ordered  Sig/Anmol


 Route  Start Time


 Stop Time Status Last Admin


Dose Admin


 


 Ondansetron HCl


  (Zofran Inj)  4 mg  NOW  STAT


 IV  1/15/18 21:21


 1/15/18 21:23 DC 1/15/18 21:40


4 MG


 


 Sodium Chloride  1,000 ml @ 


 999 mls/hr  Q1H1M STAT


 IV  1/15/18 21:21


 1/15/18 22:21 DC 1/15/18 21:42


999 MLS/HR


 


 Ketorolac


 Tromethamine


  (Toradol Inj)  30 mg  NOW  STAT


 IV  1/15/18 21:21


 1/15/18 21:23 DC 1/15/18 21:41


30 MG


 


 Acetaminophen


  (Tylenol Tab)  650 mg  NOW  STAT


 PO  1/15/18 21:21


 1/15/18 21:23 DC 1/15/18 21:42


650 MG











ED Course


6: The patient was evaluated in room B9. A complete history and physical 

exam was performed.





1: Tylenol Tab 650 mg PO, Imodium Cap 2 mg PO, Toradol Inj 30 mg IV, Sodium 

Chloride 1000 ml @ 999 mls/hr IV, Zofran Inj 4 mg IV.





2223: Reevaluated the patient. Discussed results and discharge instructions: 

She verbalized understanding and agreement. The patient is ready for discharge.





2230: Ondansetron HCI 1 homepack PO.





Medical Decision


The patient is a 37 year old female who presents to the ED with complaints of 

nausea.  Differential diagnoses considered include viral illness, food born 

illness, dehydration, electrolyte imbalance, anemia, pregnancy.





There is no leukocytosis or concerning anemia.  No significant electrolyte 

abnormality, kidney failure, hepatitis or pancreatitis.  Pregnancy testing was 

negative.  On exam, the patient did not have peritonitis.  She was not febrile 

or toxic.  On exam, she did have hyperactive bowel sounds consistent with 

stomach upset.





Patient received IV saline, IV Zofran, IV Toradol, oral Tylenol and oral 

Imodium.  She seems fairly comfortable.





I suspect this illness is either food borne or viral.  The patient is being 

discharged with Zofran, Imodium, Tylenol, a bland diet and rest.  If she is 

worsening or not improving, she can return.





Medication Reconcilliation


Current Medication List:  was personally reviewed by me





Blood Pressure Screening


Patient's blood pressure:  Elevated blood pressure


Blood pressure disposition:  Elevated BP felt to be situational





Impression





 Primary Impression:  


 Nausea, vomiting, and diarrhea


 Additional Impression:  


 Diffuse abdominal pain





Scribe Attestation


The scribe's documentation has been prepared under my direction and personally 

reviewed by me in its entirety. I confirm that the note above accurately 

reflects all work, treatment, procedures, and medical decision making performed 

by me.





Departure Information


Dispostion


Home / Self-Care





Prescriptions





Ondasetron Odt (ZOFRAN ODT) 4 Mg Tab


4 MG SL Q6H for Nausea, #6 TAB


   Prov: Bernardo Del Rio M.D.         1/15/18





Referrals


No Doctor, Assigned (PCP)





Patient Instructions


My [facility]





Additional Instructions





zofran 1-2 tab every 6 hours for nausea as needed


tylenol for pain


may use immodium otc for diarrhea as directed


bland diet---crackers, soup, toast, gatorade


rest


return for worsening symptoms or if not improving





Problem Qualifiers

## 2018-04-24 ENCOUNTER — HOSPITAL ENCOUNTER (EMERGENCY)
Dept: HOSPITAL 45 - C.EDB | Age: 38
Discharge: HOME | End: 2018-04-24
Payer: COMMERCIAL

## 2018-04-24 VITALS
HEIGHT: 62.01 IN | BODY MASS INDEX: 19.43 KG/M2 | WEIGHT: 106.92 LBS | BODY MASS INDEX: 19.43 KG/M2 | WEIGHT: 106.92 LBS | HEIGHT: 62.01 IN

## 2018-04-24 VITALS
OXYGEN SATURATION: 100 % | HEART RATE: 68 BPM | SYSTOLIC BLOOD PRESSURE: 107 MMHG | TEMPERATURE: 97.88 F | DIASTOLIC BLOOD PRESSURE: 75 MMHG

## 2018-04-24 DIAGNOSIS — J45.909: ICD-10-CM

## 2018-04-24 DIAGNOSIS — R19.7: ICD-10-CM

## 2018-04-24 DIAGNOSIS — R11.2: Primary | ICD-10-CM

## 2018-04-24 DIAGNOSIS — F17.200: ICD-10-CM

## 2018-04-24 DIAGNOSIS — R10.9: ICD-10-CM

## 2018-04-24 DIAGNOSIS — Z88.0: ICD-10-CM

## 2018-04-24 DIAGNOSIS — R68.83: ICD-10-CM

## 2018-04-24 DIAGNOSIS — Z91.018: ICD-10-CM

## 2018-04-24 DIAGNOSIS — Z88.8: ICD-10-CM

## 2018-04-24 DIAGNOSIS — Z79.899: ICD-10-CM

## 2018-04-24 LAB
ALBUMIN SERPL-MCNC: 4.5 GM/DL (ref 3.4–5)
ALP SERPL-CCNC: 65 U/L (ref 45–117)
ALT SERPL-CCNC: 22 U/L (ref 12–78)
AST SERPL-CCNC: 13 U/L (ref 15–37)
BASOPHILS # BLD: 0.05 K/UL (ref 0–0.2)
BASOPHILS NFR BLD: 0.9 %
BUN SERPL-MCNC: 14 MG/DL (ref 7–18)
CALCIUM SERPL-MCNC: 8.9 MG/DL (ref 8.5–10.1)
CO2 SERPL-SCNC: 26 MMOL/L (ref 21–32)
CREAT SERPL-MCNC: 0.74 MG/DL (ref 0.6–1.2)
EOS ABS #: 0.06 K/UL (ref 0–0.5)
EOSINOPHIL NFR BLD AUTO: 197 K/UL (ref 130–400)
GLUCOSE SERPL-MCNC: 99 MG/DL (ref 70–99)
HCT VFR BLD CALC: 40.7 % (ref 37–47)
HGB BLD-MCNC: 14.3 G/DL (ref 12–16)
IG#: 0.01 K/UL (ref 0–0.02)
IMM GRANULOCYTES NFR BLD AUTO: 29.7 %
LIPASE: 115 U/L (ref 73–393)
LYMPHOCYTES # BLD: 1.69 K/UL (ref 1.2–3.4)
MCH RBC QN AUTO: 32.8 PG (ref 25–34)
MCHC RBC AUTO-ENTMCNC: 35.1 G/DL (ref 32–36)
MCV RBC AUTO: 93.3 FL (ref 80–100)
MONO ABS #: 0.31 K/UL (ref 0.11–0.59)
MONOCYTES NFR BLD: 5.4 %
NEUT ABS #: 3.57 K/UL (ref 1.4–6.5)
NEUTROPHILS # BLD AUTO: 1.1 %
NEUTROPHILS NFR BLD AUTO: 62.7 %
PMV BLD AUTO: 11.4 FL (ref 7.4–10.4)
POTASSIUM SERPL-SCNC: 3.7 MMOL/L (ref 3.5–5.1)
PROT SERPL-MCNC: 7.3 GM/DL (ref 6.4–8.2)
RED CELL DISTRIBUTION WIDTH CV: 12.9 % (ref 11.5–14.5)
RED CELL DISTRIBUTION WIDTH SD: 44.1 FL (ref 36.4–46.3)
SODIUM SERPL-SCNC: 139 MMOL/L (ref 136–145)
WBC # BLD AUTO: 5.69 K/UL (ref 4.8–10.8)

## 2018-04-24 NOTE — EMERGENCY ROOM VISIT NOTE
History


Report prepared by Rudy:  Alicja Patiño


Under the Supervision of:  Dr. Yadiel Núñez M.D.


First contact with patient:  15:09


Chief Complaint:  ILLNESS


Stated Complaint:  FLU,FEVER,DIARRHEA





History of Present Illness


The patient is a 38 year old female who presents to the Emergency Room with 

complaints of generalized illness beginning a couple days ago. The patient 

reports nausea, diarrhea, vomiting, chills, and abdominal pressure. She denies 

any sick contact. She also denies eating anything out of the ordinary. The 

patient reports taking an Aleve with no relief. She denies any recent 

antibiotic use. The patient has a history of two C-sections. Pt denies LOC, 

headache, fevers, diaphoresis, visual changes, neck pain, chest pain, breathing 

difficulties, back pain, melena, hematochezia, urinary symptoms, numbness, 

weakness, lymphadenopathy, rash, or other complaints.





   Source of History:  patient


   Onset:  a couple days ago


   Position:  other (generalized)


   Quality:  other (illness)


   Timing:  constant


   Associated Symptoms:  + chills, + nausea, + vomiting, + abdominal pain, + 

diarrhea, No fevers, No headache, No diaphoresis, No cough, No neck pain, No 

chest pain, No SOB, No back pain, No weakness, No numbness





Review of Systems


See HPI for pertinent positives and negatives.  A total of ten systems were 

reviewed and were otherwise negative.





Past Medical & Surgical


Medical Problems:


(1) Allergic reaction


(2) Asthma


Surgical Problems:


(1) H/O  section


(2) S/P  section








Family History





No pertinent family history





Social History


Smoking Status:  Current Every Day Smoker


Alcohol Use:  occasionally


Marital Status:  


Housing Status:  lives with family


Occupation Status:  employed





Current/Historical Medications


Scheduled


Cyanocobalamin (Vitamin B-12), Unknown Dose PO DAILY


Gabapentin (Neurontin), 800 MG PO QAM


Gabapentin (Neurontin), 1,600 MG PO QPM


Magnesium Oxide (Mag-Ox), 400 MG PO QPM


Naproxen (Aleve), 220 MG PO UD





Scheduled PRN


Sumatriptan Succinate (Imitrex), 500 MG PO PRN PRN for Migraine





Allergies


Coded Allergies:  


     Mushroom (Unverified  Allergy, Severe, ANAPHYLAXIS, 18)


     Penicillins (Unverified  Allergy, Unknown, anaphylaxis, 18)


     Vitamin B12 (Unverified  Allergy, Unknown, ., 18)


Uncoded Allergies:  


     VENISON (Allergy, Severe, ANAPHYLAXIS, 17)





Physical Exam


Vital Signs











  Date Time  Temp Pulse Resp B/P (MAP) Pulse Ox O2 Delivery O2 Flow Rate FiO2


 


18 17:50 36.6 68 15 107/75 100   


 


18 17:21  68 15 125/75 100 Room Air  


 


18 16:20  72 18 134/76 98 Room Air  


 


18 14:26 36.6 95 18 114/81 98 Room Air  











Physical Exam


GENERAL: Awake, alert, uncomfortable-appearing, in no distress


HENT: Normocephalic, atraumatic. Oropharynx unremarkable.


EYES: Normal conjunctiva. Sclera non-icteric.


NECK: Supple. No nuchal rigidity. FROM. No masses.


RESPIRATORY: Clear to auscultation. No wheezes. No rales.  Normal respiratory 

effort.


CARDIAC: Normal rate.  Normal rhythm. No murmurs.  No rubs. Extremities warm 

and well perfused. Pulses equal.  No JVD.


GI: Mild epigastric tenderness. Soft, non-distended. No rebound or guarding. No 

masses.


RECTAL: Deferred.


MUSCULOSKELETAL: Atraumatic. Chest examination reveals no tenderness. The back 

is symmetrical on inspection without obvious abnormality. There is no CVA 

tenderness to palpation. No joint edema. 


LOWER EXTREMITIES: Calves are equal size bilaterally and non-tender. No edema. 

No discoloration. 


NEURO: Normal sensorium. No sensory or motor deficits noted. 


SKIN: No rash or jaundice noted.





Medical Decision & Procedures


Laboratory Results


18 15:30








Red Blood Count 4.36, Mean Corpuscular Volume 93.3, Mean Corpuscular Hemoglobin 

32.8, Mean Corpuscular Hemoglobin Concent 35.1, Mean Platelet Volume 11.4, 

Neutrophils (%) (Auto) 62.7, Lymphocytes (%) (Auto) 29.7, Monocytes (%) (Auto) 

5.4, Eosinophils (%) (Auto) 1.1, Basophils (%) (Auto) 0.9, Neutrophils # (Auto) 

3.57, Lymphocytes # (Auto) 1.69, Monocytes # (Auto) 0.31, Eosinophils # (Auto) 

0.06, Basophils # (Auto) 0.05





18 15:30

















Test


  18


15:25 18


15:30


 


Urine Color YELLOW  


 


Urine Appearance CLEAR (CLEAR)  


 


Urine pH 8.5 (4.5-7.5)  


 


Urine Specific Gravity


  1.012


(1.000-1.030) 


 


 


Urine Protein NEG (NEG)  


 


Urine Glucose (UA) NEG (NEG)  


 


Urine Ketones NEG (NEG)  


 


Urine Occult Blood NEG (NEG)  


 


Urine Nitrite NEG (NEG)  


 


Urine Bilirubin NEG (NEG)  


 


Urine Urobilinogen NEG (NEG)  


 


Urine Leukocyte Esterase NEG (NEG)  


 


White Blood Count


  


  5.69 K/uL


(4.8-10.8)


 


Red Blood Count


  


  4.36 M/uL


(4.2-5.4)


 


Hemoglobin


  


  14.3 g/dL


(12.0-16.0)


 


Hematocrit  40.7 % (37-47) 


 


Mean Corpuscular Volume


  


  93.3 fL


()


 


Mean Corpuscular Hemoglobin


  


  32.8 pg


(25-34)


 


Mean Corpuscular Hemoglobin


Concent 


  35.1 g/dl


(32-36)


 


Platelet Count


  


  197 K/uL


(130-400)


 


Mean Platelet Volume


  


  11.4 fL


(7.4-10.4)


 


Neutrophils (%) (Auto)  62.7 % 


 


Lymphocytes (%) (Auto)  29.7 % 


 


Monocytes (%) (Auto)  5.4 % 


 


Eosinophils (%) (Auto)  1.1 % 


 


Basophils (%) (Auto)  0.9 % 


 


Neutrophils # (Auto)


  


  3.57 K/uL


(1.4-6.5)


 


Lymphocytes # (Auto)


  


  1.69 K/uL


(1.2-3.4)


 


Monocytes # (Auto)


  


  0.31 K/uL


(0.11-0.59)


 


Eosinophils # (Auto)


  


  0.06 K/uL


(0-0.5)


 


Basophils # (Auto)


  


  0.05 K/uL


(0-0.2)


 


RDW Standard Deviation


  


  44.1 fL


(36.4-46.3)


 


RDW Coefficient of Variation


  


  12.9 %


(11.5-14.5)


 


Immature Granulocyte % (Auto)  0.2 % 


 


Immature Granulocyte # (Auto)


  


  0.01 K/uL


(0.00-0.02)


 


Anion Gap


  


  5.0 mmol/L


(3-11)


 


Est Creatinine Clear Calc


Drug Dose 


  77.4 ml/min 


 


 


Estimated GFR (


American) 


  117.5 


 


 


Estimated GFR (Non-


American 


  101.3 


 


 


BUN/Creatinine Ratio  18.7 (10-20) 


 


Calcium Level


  


  8.9 mg/dl


(8.5-10.1)


 


Total Bilirubin


  


  0.5 mg/dl


(0.2-1)


 


Direct Bilirubin


  


  0.1 mg/dl


(0-0.2)


 


Aspartate Amino Transf


(AST/SGOT) 


  13 U/L (15-37) 


 


 


Alanine Aminotransferase


(ALT/SGPT) 


  22 U/L (12-78) 


 


 


Alkaline Phosphatase


  


  65 U/L


()


 


Total Protein


  


  7.3 gm/dl


(6.4-8.2)


 


Albumin


  


  4.5 gm/dl


(3.4-5.0)


 


Lipase


  


  115 U/L


()


 


Human Chorionic Gonadotropin,


Qual 


  NEG (NEG) 


 





Laboratory results reviewed by me





Medications Administered











 Medications


  (Trade)  Dose


 Ordered  Sig/Anmol


 Route  Start Time


 Stop Time Status Last Admin


Dose Admin


 


 Sodium Chloride  1,000 ml @ 


 999 mls/hr  Q1H1M STAT


 IV  18 15:09


 18 16:09 DC 18 15:33


999 MLS/HR


 


 Ondansetron HCl


  (Zofran Inj)  4 mg  NOW  STAT


 IV  18 15:16


 18 15:18 DC 18 15:33


4 MG


 


 Promethazine HCl


  (Phenergan 25MG


 Home Pack)  1 homepack  UD  ONCE


 PO  18 17:30


 18 17:31 DC 18 17:23


1 HOMEPACK











ED Course


1509: Ordered Sodium Chloride 1000 ml @ 999 mls/hr IV. 





1515: The patient was evaluated in room C4. A complete history and physical 

exam was performed.





1516: Ordered Zofran Inj 4 mg IV.





1633: I updated the patient and she is feeling better. We will try a PO 

challenge of crackers and ginger ale. 





1705: The patient was unable to provide a stool sample. 





1724: On reassessment, the patient is resting comfortably. 





1730: Ordered Promethazine HCl 1 homepack PO. 





1735: I reevaluated the patient. Discussed results and discharge instructions: 

She verbalized understanding and agreement. The patient is ready for discharge.





Medical Decision


Prior records/ancillary studies reviewed.


Triage Nursing notes reviewed and agree them.





The patient's history was concerning for nausea, vomiting, diarrhea, and 

abdominal pain.  





Differential diagnosis:


Etiologies such as gastroenteritis, food borne illness, infections, appendicitis

, diverticulitis, inflammatory bowel disease, GI bleed, biliary pathology, as 

well as others were entertained.  





Physical examination findings:


As above.  No peritoneal findings.





ER treatment provided:


IV hydration 1 L NSS.  


Zofran 4 mg IV


On reassessment the patient felt better.  Patient was tolerating p.o. intake.





Diagnostics interpretation by me:


The labs revealed an unremarkable CBC and chemistry panel.  Patient is not 

pregnant.  Urinalysis unremarkable.  The patient was unable to provide a stool 

specimen.





Imaging studies:


Deferred





The patient presented with vomiting and diarrhea.  She was hydrated given 

Zofran.  Her symptoms resolved.  She was unable to provide a stool specimen.  

She had no risk factors for C. difficile.  This seems like a gastroenteritis.  

She had a benign abdomen.  She has no white count or fever.  She has had no 

bloody stool.  If she worsens in any way she will be back.  She was given a 

Phenergan home pack and told to follow-up with her PCP.  She was given 2 days 

off of work as well.  I gave my usual and customary discussion regarding this 

issue.


By the evaluation outlined above other emergent etiologies such as those listed 

in the differential, as well as others, were deemed relatively unlikely.  





The patient was educated about the findings as listed above.  All questions 

were answered and the patient was pleased with the treatment.  Return 

instructions were outlined and the patient was discharged in stable condition.  





The patient was referred to her PCP for follow-up for a recheck of the current 

condition.





Medication Reconcilliation


Current Medication List:  was personally reviewed by me





Blood Pressure Screening


Patient's blood pressure:  Normal blood pressure





Impression





 Primary Impression:  


 Nausea


 Additional Impressions:  


 Vomiting


 Diarrhea





Scribe Attestation


The scribe's documentation has been prepared under my direction and personally 

reviewed by me in its entirety. I confirm that the note above accurately 

reflects all work, treatment, procedures, and medical decision making performed 

by me.





Departure Information


Dispostion


Home / Self-Care





Referrals


Juliette Membreno D.O. (PCP)





Forms


HOME CARE DOCUMENTATION FORM,                                                 

               IMPORTANT VISIT INFORMATION, WORK / SCHOOL INSTRUCTIONS





Patient Instructions


My [facility]





Additional Instructions





Imodium:  This is available over-the-counter.  Start out with two pills then 

take one after each loose bowel movement.  You can take a maximum of 8 in one 

day.  Only used as needed.  Stop if you have bloody stools.





Phenergan(promethazine) tablets 25mg:  Take one every six hours as needed for 

nausea. Avoid alcohol, operating machinery or dangerous equipment, working on 

ladders or roofs, DRIVING, or situations where being under the influence may be 

dangerous.





Ibuprofen(Motrin, Advil) may be used for fever or pain.  Use 600mg every six 

hours as needed.  Take with food.  Avoid using more than 2400mg in a 24 hour 

period.  Do not use 2400mg per day for more than three consecutive days without 

physician direction.  Prolonged inappropriate use can lead to stomach upset or 

ulcers. 


(AND/OR)


Acetaminophen(Tylenol) may be used for fever or pain.  Use 1000mg every six 

hours as needed.  Avoid using more than 4000mg in a 24 hour period.  





Rest and drink plenty of fluids as tolerated.  Slow sips of water or sports 

drinks are recommended instead of large amounts all at once. 


 


Continue current medications.





Once your stomach is settled start with a clear liquid diet (jello, soup broth, 

etc.) and then advance as tolerated.  You should avoid full, heavy meals for 

about 24 hrs from the time your symptoms resolved.  





Return to the ER for persistent vomiting, fevers, abdominal pain, chest pains, 

difficulty breathing, black or bloody stools, worsening of your condition, or 

as needed.





Follow up with your primary physician in 2-3 days for a recheck of your current 

condition





Problem Qualifiers